# Patient Record
Sex: FEMALE | Race: WHITE | NOT HISPANIC OR LATINO | Employment: OTHER | URBAN - METROPOLITAN AREA
[De-identification: names, ages, dates, MRNs, and addresses within clinical notes are randomized per-mention and may not be internally consistent; named-entity substitution may affect disease eponyms.]

---

## 2024-03-21 ENCOUNTER — TRANSCRIBE ORDERS (OUTPATIENT)
Dept: ORTHOPEDIC SURGERY | Facility: HOSPITAL | Age: 39
End: 2024-03-21
Payer: COMMERCIAL

## 2024-03-21 DIAGNOSIS — M54.50 LOW BACK PAIN, UNSPECIFIED BACK PAIN LATERALITY, UNSPECIFIED CHRONICITY, UNSPECIFIED WHETHER SCIATICA PRESENT: ICD-10-CM

## 2024-03-22 ENCOUNTER — OFFICE VISIT (OUTPATIENT)
Dept: ORTHOPEDIC SURGERY | Facility: CLINIC | Age: 39
End: 2024-03-22
Payer: COMMERCIAL

## 2024-03-22 ENCOUNTER — HOSPITAL ENCOUNTER (OUTPATIENT)
Dept: RADIOLOGY | Facility: CLINIC | Age: 39
Discharge: HOME | End: 2024-03-22
Payer: COMMERCIAL

## 2024-03-22 VITALS — WEIGHT: 191 LBS | HEIGHT: 65 IN | BODY MASS INDEX: 31.82 KG/M2

## 2024-03-22 DIAGNOSIS — M51.36 DDD (DEGENERATIVE DISC DISEASE), LUMBAR: Primary | ICD-10-CM

## 2024-03-22 DIAGNOSIS — M54.16 LUMBAR RADICULOPATHY: ICD-10-CM

## 2024-03-22 DIAGNOSIS — M54.50 LOW BACK PAIN, UNSPECIFIED BACK PAIN LATERALITY, UNSPECIFIED CHRONICITY, UNSPECIFIED WHETHER SCIATICA PRESENT: ICD-10-CM

## 2024-03-22 PROCEDURE — 72110 X-RAY EXAM L-2 SPINE 4/>VWS: CPT

## 2024-03-22 PROCEDURE — 72110 X-RAY EXAM L-2 SPINE 4/>VWS: CPT | Performed by: RADIOLOGY

## 2024-03-22 PROCEDURE — 99203 OFFICE O/P NEW LOW 30 MIN: CPT | Performed by: ORTHOPAEDIC SURGERY

## 2024-03-22 PROCEDURE — 1036F TOBACCO NON-USER: CPT | Performed by: ORTHOPAEDIC SURGERY

## 2024-03-22 RX ORDER — BUPROPION HYDROCHLORIDE 300 MG/1
300 TABLET ORAL DAILY
COMMUNITY

## 2024-03-22 RX ORDER — ERGOCALCIFEROL 1.25 MG/1
1 CAPSULE ORAL
COMMUNITY
Start: 2024-03-06

## 2024-03-22 RX ORDER — DESVENLAFAXINE 100 MG/1
100 TABLET, EXTENDED RELEASE ORAL DAILY
COMMUNITY

## 2024-03-22 RX ORDER — DULAGLUTIDE 4.5 MG/.5ML
INJECTION, SOLUTION SUBCUTANEOUS
COMMUNITY
Start: 2024-02-05

## 2024-03-25 NOTE — PROGRESS NOTES
HPI:Mariela Cuevas is a pleasant 39-year-old woman, who comes in today with a history of low back pain, intermittent symptoms into the legs, and even problems with urinary retention.  Her symptoms began back in 2019.  She has not had urodynamics studies done.  She has not had any recent physical therapy done.  She has had imaging done at the [facility].  She has no saddle anesthesia.      ROS:  Reviewed on EMR and patient intake sheet.    PMH/SH:  Reviewed on EMR and patient intake sheet.    Exam:  Physical Exam    Constitutional: Well appearing; no acute distress  Eyes: pupils are equal and round  Psych: normal affect  Respiratory: non-labored breathing  Cardiovascular: regular rate and rhythm  GI: non-distended abdomen  Musculoskeletal: no pain with range of motion of the hips bilaterally  Neurologic: [4+]/5 strength in the lower extremities bilaterally]; [negative] straight leg raise    Radiology:     MRI from the [facility] was personally reviewed.  She does have a transitional lumbosacral vertebrae with mild disc degeneration.  There are some moderate canal narrowing.    Diagnosis:    Disc degeneration; lumbar stenosis; lumbar radiculopathy    Assessment and Plan:   39-year-old woman, with some chronic back pain and intermittent radicular symptoms over the course of the last 5 years.  Her MRI does not demonstrate any severe canal stenosis.  I have recommended some physical therapy for the lumbar spine and potential reimaging if her symptoms do not improve.  Additionally I recommended that she follow-up with urologist for further urodynamic studies as the degree of stenosis on her MRI does not explain her more chronic urinary retention and incontinence issues.  I can see her back as needed should her symptoms not improved.    The patient was in agreement with the plan. At the end of the visit today, the patient felt that all questions had been answered satisfactorily.  The patient was pleased  with the visit and very appreciative for the care rendered.     Thank you very much for the kind referral.  It is a privilege, and a pleasure, to partner with you in the care of your patients.  I would be delighted to assist you with any further consultations as needed.          Maikel Linder MD    Chief of Spine Surgery, [facility]  Director of Spine Service, [facility]  , Department of Orthopaedics  [employer]  [address] [address] [id number]  P: [phone]  [facility]    This note was dictated with voice recognition software.  It has not been proofread for grammatical errors, typographical mistakes or other semantic inconsistencies.

## 2024-04-10 ENCOUNTER — EVALUATION (OUTPATIENT)
Dept: PHYSICAL THERAPY | Facility: HOSPITAL | Age: 39
End: 2024-04-10
Payer: COMMERCIAL

## 2024-04-10 DIAGNOSIS — M54.16 LUMBAR RADICULOPATHY: Primary | ICD-10-CM

## 2024-04-10 PROCEDURE — 97161 PT EVAL LOW COMPLEX 20 MIN: CPT | Mod: GP | Performed by: PHYSICAL THERAPIST

## 2024-04-10 ASSESSMENT — ENCOUNTER SYMPTOMS
LOSS OF SENSATION IN FEET: 0
DEPRESSION: 0
OCCASIONAL FEELINGS OF UNSTEADINESS: 0

## 2024-04-10 ASSESSMENT — PAIN - FUNCTIONAL ASSESSMENT: PAIN_FUNCTIONAL_ASSESSMENT: 0-10

## 2024-04-10 ASSESSMENT — PAIN DESCRIPTION - DESCRIPTORS: DESCRIPTORS: SHARP;DULL;THROBBING

## 2024-04-10 ASSESSMENT — PAIN SCALES - GENERAL: PAINLEVEL_OUTOF10: 3

## 2024-04-10 NOTE — PROGRESS NOTES
"  Physical Therapy  Physical Therapy Evaluation     Patient Name: Mariela Cuevas  MRN: [id number]  Today's Date: 4/10/2024  Time Calculation  Start Time: 1057  Stop Time: 1130  Time Calculation (min): 33 min    Today's Charges  PT Evaluation Time Entry  PT Evaluation (Low) Time Entry: 33    Insurance:  Visit number: 1 of 1  Authorization info: requires auth after eval  Insurance Type: [employer]    Current Problem  1. Lumbar radiculopathy  Referral to Physical Therapy    Follow Up In Physical Therapy          General:  General  Reason for Referral: LBP  Referred By: Dr. Linder      Precautions:   Precautions  STEADI Fall Risk Score (The score of 4 or more indicates an increased risk of falling): 1    Medical History Form: Reviewed (scanned into chart)    Subjective:   Subjective   Chief Complaint: Patient is a 39 year old female who presents to clinic with complaints of low back pain. Patient states that she has had low back pain for a few years.  Onset Date: 3/22/2024  LESLEE: Chronic    Current Condition:   Better    Pain:  Pain Assessment: 0-10  Pain Score: 3 (up to 6/10)  Pain Location: Back  Pain Orientation: Right, Left, Lower  Pain Radiating Towards: bilateral anterior hip with occasional pain in left lateral lower leg  Pain Descriptors: Sharp, Dull, Throbbing  Pain Frequency: Intermittent    Aggravating Factors:  Sitting, Bending Forward, and Carrying  Relieving Factors:  walking    Relevant Information (PMH & Previous Tests/Imaging): recent x ray indicating, \"Mild lumbar degenerative changes slightly more prominent at the L4-5 level.\"  Previous Interventions/Treatments: Chiropractic    Prior Level of Function (PLOF)  Patient previously independent with all ADLs  Exercise/Physical Activity: kayak,  baseball  Work/School: full time    Hand Dominance: right     Patients Living Environment: Reviewed and no concern  Home Living  Home Living Comment: No stairs, no mobility concerns.  Prior Function Per " Pt/Caregiver Report  Level of [place]: Independent with ADLs and functional transfers, Independent with homemaking with ambulation  Primary Language: English    Patient's Goal(s) for Therapy: decrease low back pain     Red Flags: Do you have any of the following? No  Fever/chills, unexplained weight changes, dizziness/fainting, unexplained change in bowel or bladder functions, unexplained malaise or muscle weakness, night pain/sweats, numbness or tingling    Objective:  Objective     Lumbar AROM  Lumbar AROM WFL: no    Special Tests  Slump: (Negative): + BLE     Lumbar AROM  Lumbar AROM WFL: no  Lumbar flexion: (60°): mod restriction (pain)  Lumbar extension (25°): min restriction (pain)  Lumbar rotation right (30°): WFL (pain)  Lumbar rotation left (30°): WFL  Lumbar sidebend right (25°): WFL  Lumbar sidebend left (25°): WFL (pain)    Hip PROM  Hip PROM WFL: yes    Specific Lower Extremity MMT  R Iliopsoas: (5/5): 4/5  L Iliopsoas: (5/5): 4/5  R Gluteals (prone): (5/5): 4+/5  L Gluteals (prone): (5/5): 4+/5  R Gluteals (sidelying): (5/5): 5/5  L Gluteals (sidelying): (5/5): 5/5    Special Tests  Supine SLR: (Negative): -  HONORIO: (Negative): -    Flexibility  R hamstrings: WFL  L hamstrings: WFL    Knee MMT  R knee flexion: (5/5): 5/5  L knee flexion: (5/5): 5/5  R knee extension: (5/5): not tested due to pain  L knee extension: (5/5): not tested due to pain    Posture: shoulder rounded forward    Lower Extremity Functional Movements  Transfers: Independent  Gait: none  Assistive Device no device    Outcome Measures:  Other Measures  Oswestry Disablity Index (VIKOTR): 26% (13)     EDUCATION:   Individual(s) Educated: patient   Education Provided: Home exercise program, plan of care, activity modifications, pain management, and injury pathology  Handout(s) Provided: Scanned into chart  Home Program: Access Code: [email or web address]  Risk and Benefits Discussed with Patient/Caregiver/Other: Yes   Patient/Caregiver Demonstrated  Understanding: Yes   Plan of Care Discussed and Agreed Upon: Yes   Patient Response to Education: Patient/Caregiver verbalized understanding of information and Patient/Caregiver performed return demonstration of exercises/activities    Assessment: Patient demonstrated impaired strength and limited range of motion due to low back pain. Patient presents with signs and symptoms consistent with low back pain, resulting in limited participation in pain-free ADLs and inability to perform at their prior level of function. Pt would benefit from physical therapy to address the impairments found & listed previously in the objective section in order to return to safe and pain-free ADLs and prior level of function.  PT Assessment Results: Decreased strength, Pain, Decreased range of motion  Rehab Prognosis: Good  Evaluation/Treatment Tolerance: Patient limited by pain    Complexity: low    Plan:  Treatment/Interventions: Education/ Instruction, Gait training, Manual therapy, Neuromuscular re-education, Therapeutic activities, Therapeutic exercises  PT Plan: Skilled PT  PT Frequency: 1 time per week  Duration: 4 weeks  Onset Date: 03/22/24  Certification Period Start Date: 04/10/24  Certification Period End Date: 05/08/24  Number of Treatments Authorized: 1 of 1  Rehab Potential: Good  Plan of Care Agreement: Patient  Planned Interventions include: therapeutic exercise, self-care home management, manual therapy, therapeutic activities, gait training, neuromuscular coordination, vasopneumatic, dry needling, aquatic therapy    Goals: Set and discussed today  Active       PT Problem       Patient will maintain single leg stand on each leg for at least 30 sec with no upper extremity support.       Start:  04/10/24    Expected End:  05/08/24            Patient will achieve spinal flexion to WFL without pain.       Start:  04/10/24    Expected End:  05/08/24            Patient will achieve spinal extension to WFL without pain.        Start:  04/10/24    Expected End:  05/08/24            Patient will achieve bilateral spinal side bending ROM WFL without pain.       Start:  04/10/24    Expected End:  05/08/24            Patient will achieve bilateral spinal rotation ROM WFL without pain.       Start:  04/10/24    Expected End:  05/08/24            Patient will achieve bilateral hip flexion strength of at least 4+/5       Start:  04/10/24    Expected End:  05/08/24            Patient will demonstrate independence in home program for support of progression       Start:  04/10/24    Expected End:  04/24/24            Patient will report pain of no more than 2/10 demonstrating a reduction of overall pain       Start:  04/10/24    Expected End:  05/08/24            Patient will show a significant change in VIKTOR (26% to 14%) patient reported outcome tool to demonstrate subjective imporovement       Start:  04/10/24    Expected End:  05/08/24                Plan of care was developed with input and agreement by the patient          Patrick Zuñiga, PT

## 2024-04-19 ENCOUNTER — TREATMENT (OUTPATIENT)
Dept: PHYSICAL THERAPY | Facility: HOSPITAL | Age: 39
End: 2024-04-19
Payer: COMMERCIAL

## 2024-04-19 DIAGNOSIS — M54.16 LUMBAR RADICULOPATHY: ICD-10-CM

## 2024-04-19 PROCEDURE — 97110 THERAPEUTIC EXERCISES: CPT | Mod: GP,CQ

## 2024-04-19 PROCEDURE — 97140 MANUAL THERAPY 1/> REGIONS: CPT | Mod: GP,CQ

## 2024-04-19 ASSESSMENT — PAIN - FUNCTIONAL ASSESSMENT: PAIN_FUNCTIONAL_ASSESSMENT: 0-10

## 2024-04-19 ASSESSMENT — PAIN DESCRIPTION - DESCRIPTORS: DESCRIPTORS: PRESSURE;RADIATING;SHARP;SHOOTING

## 2024-04-19 ASSESSMENT — PAIN SCALES - GENERAL: PAINLEVEL_OUTOF10: 7

## 2024-04-19 NOTE — PROGRESS NOTES
Physical Therapy Treatment    Patient Name: Mariela Cuevas  MRN: [id number]  Today's Date: 4/19/2024  Time Calculation  Start Time: 1206  Stop Time: 1300  Time Calculation (min): 54 min         Current Problem  1. Lumbar radiculopathy  Follow Up In Physical Therapy          General  Reason for Referral: LBP  Referred By: Dr. Linder    Subjective   Current Condition:   Better  Patient reports pain at 10/10 last night and slightly better today with ibuprofen at 7/10. Pt. Has difficulty sitting on R hip, and reports she had been crying earlier.    Performing HEP?: Yes    Precautions  Precautions  STEADI Fall Risk Score (The score of 4 or more indicates an increased risk of falling): 1    Pain  Pain Assessment: 0-10  Pain Score: 7 (on ibuprophen)  Pain Location: Back  Pain Orientation: Right  Pain Radiating Towards: post hip  Pain Descriptors: Pressure, Radiating, Sharp, Shooting  Pain Frequency: Constant/continuous    Objective   Lumbar Spine  Functional Rating Scale   Pt. Unable to flex at waist to put her shoes on  Observation   Pt. Not shifted at conclusion of therapy. She was walking upright and felt better in LB and post L hip  Lumbar AROM   Pt. Shifted to the R, with difficulty sitting  Treatments:    Therapeutic Exercise  Therapeutic Exercise Performed: Yes  Therapeutic Exercise Activity 1: PPT 5 sec. x 10  Therapeutic Exercise Activity 2: LTR x 10  Therapeutic Exercise Activity 3: hip ADD with ball 5 sec. x 1  Therapeutic Exercise Activity 4: prone on elbows x 2 min.  Therapeutic Exercise Activity 5: up on hands 3 sec. x 10  Therapeutic Exercise Activity 6: lat shift at wall to L  Therapeutic Exercise Activity 7: hip ABD (attempt caused pain)  Therapeutic Exercise Activity 8: bridge (attempt caused pain)    Manual Therapy  Manual Therapy Performed: Yes  Manual Therapy Activity 1: light distraction lumbar spine  Manual Therapy Activity 2: Lat shift to L to fix shift to R  Manual Therapy Activity 3: STM/DTM  to LB to decrease muscle guarding    EDUCATION:   Outpatient Education  Individual(s) Educated: Patient  Education Provided: Home Exercise Program  Patient/Caregiver Demonstrated Understanding: yes    Assessment: Pt able to tolerate light exercises, mainly positioning, and noted extension preference, Pt. Reported excruciating pain at 10/10 last night and slightly better today with ibuprofen.and pain centralized after treatment. Will continue PT POC and advance as tolerated.  PT Assessment  PT Assessment Results: Decreased strength, Pain, Decreased range of motion  Rehab Prognosis: Good  Evaluation/Treatment Tolerance: Patient limited by pain    Plan:  OP PT Plan  Treatment/Interventions: Education/ Instruction  PT Plan: Skilled PT  PT Frequency: 1 time per week  Duration: 4 weeks  Onset Date: 03/22/24  Certification Period Start Date: 04/10/24  Certification Period End Date: 05/08/24  Number of Treatments Authorized: 2 of 4  Rehab Potential: Good  Plan of Care Agreement: Patient    Goals:  Active       PT Problem       Patient will maintain single leg stand on each leg for at least 30 sec with no upper extremity support.       Start:  04/10/24    Expected End:  05/08/24            Patient will achieve spinal flexion to WFL without pain.       Start:  04/10/24    Expected End:  05/08/24            Patient will achieve spinal extension to WFL without pain.       Start:  04/10/24    Expected End:  05/08/24            Patient will achieve bilateral spinal side bending ROM WFL without pain.       Start:  04/10/24    Expected End:  05/08/24            Patient will achieve bilateral spinal rotation ROM WFL without pain.       Start:  04/10/24    Expected End:  05/08/24            Patient will achieve bilateral hip flexion strength of at least 4+/5       Start:  04/10/24    Expected End:  05/08/24            Patient will demonstrate independence in home program for support of progression       Start:  04/10/24    Expected  End:  04/24/24            Patient will report pain of no more than 2/10 demonstrating a reduction of overall pain       Start:  04/10/24    Expected End:  05/08/24            Patient will show a significant change in VIKTOR (26% to 14%) patient reported outcome tool to demonstrate subjective imporovement       Start:  04/10/24    Expected End:  05/08/24                 Roxanna Gentile, PTA

## 2024-04-26 ENCOUNTER — TREATMENT (OUTPATIENT)
Dept: PHYSICAL THERAPY | Facility: HOSPITAL | Age: 39
End: 2024-04-26
Payer: COMMERCIAL

## 2024-04-26 DIAGNOSIS — M54.16 LUMBAR RADICULOPATHY: ICD-10-CM

## 2024-04-26 PROCEDURE — 97110 THERAPEUTIC EXERCISES: CPT | Mod: GP | Performed by: PHYSICAL THERAPIST

## 2024-04-26 ASSESSMENT — PAIN SCALES - GENERAL: PAINLEVEL_OUTOF10: 0 - NO PAIN

## 2024-04-26 ASSESSMENT — PAIN - FUNCTIONAL ASSESSMENT: PAIN_FUNCTIONAL_ASSESSMENT: 0-10

## 2024-04-26 NOTE — PROGRESS NOTES
"  Physical Therapy Treatment    Patient Name: Mariela Cuevas  MRN: [id number]  Today's Date: 4/26/2024  Time Calculation  Start Time: 1033  Stop Time: 1112  Time Calculation (min): 39 min                         Current Problem  1. Lumbar radiculopathy  Follow Up In Physical Therapy        Problem List Items Addressed This Visit             ICD-10-CM    Lumbar radiculopathy M54.16       General  Reason for Referral: LBP  Referred By: Dr. Linder    Subjective   Current Condition:   Better  Patient reports improved pain since her last visit.    Performing HEP?: Yes    Precautions     Pain  Pain Assessment: 0-10  Pain Score: 0 - No pain    Objective     Treatments:  Therapeutic Exercise  Therapeutic Exercise Performed: Yes  Therapeutic Exercise Activity 1: PPT 5 sec. x 10  Therapeutic Exercise Activity 2: LTR x 10  Therapeutic Exercise Activity 4: prone on elbows x 2 min.  Therapeutic Exercise Activity 5: up on hands 10x3 sec  Therapeutic Exercise Activity 6: lat shift at wall to R/L x20  Therapeutic Exercise Activity 7: standing hip abd x10  Therapeutic Exercise Activity 8: bridge with hip add x10  Therapeutic Exercise Activity 9: Sci Fit bike lvl1 x5' seat #8  Therapeutic Exercise Activity 10: standing hip ext x10  Therapeutic Exercise Activity 11: heel raises x20  Therapeutic Exercise Activity 12: gastroc stretch on wedge x1'  Therapeutic Exercise Activity 13: hamstring stretch 3x15\"  Therapeutic Exercise Activity 14: pallof press iso with dbl grn x10 R/L  Therapeutic Exercise Activity 15: STS 2# MB overhead press    EDUCATION:   Individual(s) Educated: Patient  Education Provided: yes  Handout(s) Provided: Scanned into chart  Home Program: reviewed home exercise program   Risk and Benefits Discussed with Patient/Caregiver/Other: Yes   Patient/Caregiver Demonstrated Understanding: Yes   Patient Response to Education: Patient/Caregiver verbalized understanding of information and Patient/Caregiver performed return " demonstration of exercises/activities    Assessment: Patient demonstrated improved tolerance to exercises without complaints of increased pain. Patient demonstrated the ability to sit without a side lean. Patient demonstrated hip adduction with knee touching during sit<->stand transfers.  PT Assessment  PT Assessment Results: Decreased strength, Pain, Decreased range of motion  Rehab Prognosis: Good  Evaluation/Treatment Tolerance: Patient tolerated treatment well    Plan: Continue with POC.  Progress strength and flexibility as tolerated.  OP PT Plan  Treatment/Interventions: Education/ Instruction  PT Plan: Skilled PT  PT Frequency: 1 time per week  Duration: 4 weeks  Onset Date: 03/22/24  Certification Period Start Date: 04/10/24  Certification Period End Date: 05/08/24  Number of Treatments Authorized: 3 of 4  Rehab Potential: Good  Plan of Care Agreement: Patient    Goals:  Active       PT Problem       Patient will maintain single leg stand on each leg for at least 30 sec with no upper extremity support.       Start:  04/10/24    Expected End:  05/08/24            Patient will achieve spinal flexion to WFL without pain.       Start:  04/10/24    Expected End:  05/08/24            Patient will achieve spinal extension to WFL without pain.       Start:  04/10/24    Expected End:  05/08/24            Patient will achieve bilateral spinal side bending ROM WFL without pain.       Start:  04/10/24    Expected End:  05/08/24            Patient will achieve bilateral spinal rotation ROM WFL without pain.       Start:  04/10/24    Expected End:  05/08/24            Patient will achieve bilateral hip flexion strength of at least 4+/5       Start:  04/10/24    Expected End:  05/08/24            Patient will demonstrate independence in home program for support of progression       Start:  04/10/24    Expected End:  04/24/24            Patient will report pain of no more than 2/10 demonstrating a reduction of overall pain        Start:  04/10/24    Expected End:  05/08/24            Patient will show a significant change in VIKTOR (26% to 14%) patient reported outcome tool to demonstrate subjective imporovement       Start:  04/10/24    Expected End:  05/08/24                 Patrick Zuñiga, PT

## 2024-05-03 ENCOUNTER — APPOINTMENT (OUTPATIENT)
Dept: PHYSICAL THERAPY | Facility: HOSPITAL | Age: 39
End: 2024-05-03
Payer: COMMERCIAL

## 2024-05-10 ENCOUNTER — APPOINTMENT (OUTPATIENT)
Dept: PHYSICAL THERAPY | Facility: HOSPITAL | Age: 39
End: 2024-05-10
Payer: COMMERCIAL

## 2024-06-12 ENCOUNTER — DOCUMENTATION (OUTPATIENT)
Dept: PHYSICAL THERAPY | Facility: HOSPITAL | Age: 39
End: 2024-06-12
Payer: COMMERCIAL

## 2024-06-12 NOTE — PROGRESS NOTES
Physical Therapy    Discharge Summary    Name: Mariela Cuevas  MRN: [id number]  : 1985  Date: 24    Discharge Summary: PT    Discharge Information: Date of discharge 2024, Date of last visit 2024, Date of evaluation 4/10/2024, Number of attended visits 3, Referred by Dr. Linder, and Referred for LOW BACK PAIN     Therapy Summary: Patient is a 39 year old female who presents to clinic with complaints of low back pain. Patient states that she has had low back pain for a few years.     Discharge Status: Patient demonstrated improved tolerance to exercises without complaints of increased pain. Patient demonstrated the ability to sit without a side lean. Patient demonstrated hip adduction with knee touching during sit<->stand transfers.      Rehab Discharge Reason: Failed to schedule and/or keep follow-up appointment(s)

## 2024-10-16 ENCOUNTER — APPOINTMENT (OUTPATIENT)
Facility: CLINIC | Age: 39
End: 2024-10-16
Payer: COMMERCIAL

## 2024-10-16 ENCOUNTER — LAB (OUTPATIENT)
Dept: LAB | Facility: LAB | Age: 39
End: 2024-10-16
Payer: MEDICAID

## 2024-10-16 VITALS
WEIGHT: 194 LBS | OXYGEN SATURATION: 99 % | SYSTOLIC BLOOD PRESSURE: 117 MMHG | BODY MASS INDEX: 32.28 KG/M2 | HEART RATE: 75 BPM | DIASTOLIC BLOOD PRESSURE: 77 MMHG

## 2024-10-16 DIAGNOSIS — R76.8 POSITIVE ANA (ANTINUCLEAR ANTIBODY): ICD-10-CM

## 2024-10-16 DIAGNOSIS — Z92.89 HISTORY OF POSITIVE DOUBLE STRANDED DNA ANTIBODY TEST: ICD-10-CM

## 2024-10-16 DIAGNOSIS — M54.50 CHRONIC LOW BACK PAIN, UNSPECIFIED BACK PAIN LATERALITY, UNSPECIFIED WHETHER SCIATICA PRESENT: ICD-10-CM

## 2024-10-16 DIAGNOSIS — R76.8 POSITIVE ANA (ANTINUCLEAR ANTIBODY): Primary | ICD-10-CM

## 2024-10-16 DIAGNOSIS — G89.29 CHRONIC LOW BACK PAIN, UNSPECIFIED BACK PAIN LATERALITY, UNSPECIFIED WHETHER SCIATICA PRESENT: ICD-10-CM

## 2024-10-16 PROBLEM — G95.9 MYELOPATHY (MULTI): Status: ACTIVE | Noted: 2020-01-22

## 2024-10-16 PROBLEM — R20.0 PERINEAL NUMBNESS: Status: ACTIVE | Noted: 2020-01-22

## 2024-10-16 PROBLEM — E61.0 COPPER DEFICIENCY: Status: ACTIVE | Noted: 2020-02-11

## 2024-10-16 PROBLEM — R93.7 ABNORMAL MRI, LUMBAR SPINE: Status: ACTIVE | Noted: 2020-01-22

## 2024-10-16 PROBLEM — B97.7 HUMAN PAPILLOMA VIRUS (HPV) INFECTION: Status: ACTIVE | Noted: 2023-03-10

## 2024-10-16 PROBLEM — G43.009 MIGRAINE WITHOUT AURA AND WITHOUT STATUS MIGRAINOSUS, NOT INTRACTABLE: Status: ACTIVE | Noted: 2024-02-21

## 2024-10-16 PROBLEM — F41.9 ANXIETY DISORDER: Status: ACTIVE | Noted: 2022-06-08

## 2024-10-16 PROBLEM — N31.9 NEUROGENIC BLADDER: Status: ACTIVE | Noted: 2020-01-22

## 2024-10-16 PROBLEM — E06.3 CHRONIC LYMPHOCYTIC THYROIDITIS: Status: ACTIVE | Noted: 2024-10-16

## 2024-10-16 PROBLEM — A69.20 LYME DISEASE: Status: ACTIVE | Noted: 2020-02-11

## 2024-10-16 PROBLEM — S82.851D: Status: ACTIVE | Noted: 2018-01-08

## 2024-10-16 PROBLEM — E53.8 B12 DEFICIENCY: Status: ACTIVE | Noted: 2020-02-11

## 2024-10-16 PROBLEM — H02.402 PTOSIS OF LEFT EYELID: Status: ACTIVE | Noted: 2020-02-11

## 2024-10-16 PROBLEM — E66.812 OBESITY, CLASS II, BMI 35-39.9: Status: ACTIVE | Noted: 2023-09-15

## 2024-10-16 PROBLEM — E55.9 VITAMIN D DEFICIENCY: Status: ACTIVE | Noted: 2018-01-30

## 2024-10-16 PROBLEM — F89 NEURODEVELOPMENTAL DISORDER: Status: ACTIVE | Noted: 2022-06-08

## 2024-10-16 PROBLEM — F33.0 MILD EPISODE OF RECURRENT MAJOR DEPRESSIVE DISORDER (CMS-HCC): Chronic | Status: ACTIVE | Noted: 2022-06-08

## 2024-10-16 LAB
ALBUMIN SERPL BCP-MCNC: 4.5 G/DL (ref 3.4–5)
ALP SERPL-CCNC: 75 U/L (ref 33–110)
ALT SERPL W P-5'-P-CCNC: 9 U/L (ref 7–45)
AMORPH CRY #/AREA UR COMP ASSIST: NORMAL /HPF
ANION GAP SERPL CALC-SCNC: 12 MMOL/L (ref 10–20)
APPEARANCE UR: ABNORMAL
AST SERPL W P-5'-P-CCNC: 12 U/L (ref 9–39)
BASOPHILS # BLD AUTO: 0.03 X10*3/UL (ref 0–0.1)
BASOPHILS NFR BLD AUTO: 0.7 %
BILIRUB SERPL-MCNC: 1 MG/DL (ref 0–1.2)
BILIRUB UR STRIP.AUTO-MCNC: NEGATIVE MG/DL
BUN SERPL-MCNC: 9 MG/DL (ref 6–23)
C3 SERPL-MCNC: 128 MG/DL (ref 87–200)
C4 SERPL-MCNC: 33 MG/DL (ref 10–50)
CALCIUM SERPL-MCNC: 9.5 MG/DL (ref 8.6–10.3)
CENTROMERE B AB SER-ACNC: <0.2 AI
CHLORIDE SERPL-SCNC: 109 MMOL/L (ref 98–107)
CHROMATIN AB SERPL-ACNC: <0.2 AI
CO2 SERPL-SCNC: 23 MMOL/L (ref 21–32)
COLOR UR: YELLOW
CREAT SERPL-MCNC: 0.96 MG/DL (ref 0.5–1.05)
DSDNA AB SER-ACNC: 13 IU/ML
EGFRCR SERPLBLD CKD-EPI 2021: 77 ML/MIN/1.73M*2
ENA JO1 AB SER QL IA: <0.2 AI
ENA RNP AB SER IA-ACNC: <0.2 AI
ENA SCL70 AB SER QL IA: <0.2 AI
ENA SM AB SER IA-ACNC: <0.2 AI
ENA SM+RNP AB SER QL IA: <0.2 AI
ENA SS-A AB SER IA-ACNC: <0.2 AI
ENA SS-B AB SER IA-ACNC: <0.2 AI
EOSINOPHIL # BLD AUTO: 0.06 X10*3/UL (ref 0–0.7)
EOSINOPHIL NFR BLD AUTO: 1.3 %
ERYTHROCYTE [DISTWIDTH] IN BLOOD BY AUTOMATED COUNT: 12.6 % (ref 11.5–14.5)
GLUCOSE SERPL-MCNC: 81 MG/DL (ref 74–99)
GLUCOSE UR STRIP.AUTO-MCNC: NORMAL MG/DL
HCT VFR BLD AUTO: 43.3 % (ref 36–46)
HGB BLD-MCNC: 13.9 G/DL (ref 12–16)
IMM GRANULOCYTES # BLD AUTO: 0.01 X10*3/UL (ref 0–0.7)
IMM GRANULOCYTES NFR BLD AUTO: 0.2 % (ref 0–0.9)
KETONES UR STRIP.AUTO-MCNC: NEGATIVE MG/DL
LEUKOCYTE ESTERASE UR QL STRIP.AUTO: NEGATIVE
LYMPHOCYTES # BLD AUTO: 1.56 X10*3/UL (ref 1.2–4.8)
LYMPHOCYTES NFR BLD AUTO: 35 %
MCH RBC QN AUTO: 30.2 PG (ref 26–34)
MCHC RBC AUTO-ENTMCNC: 32.1 G/DL (ref 32–36)
MCV RBC AUTO: 94 FL (ref 80–100)
MONOCYTES # BLD AUTO: 0.33 X10*3/UL (ref 0.1–1)
MONOCYTES NFR BLD AUTO: 7.4 %
MUCOUS THREADS #/AREA URNS AUTO: NORMAL /LPF
NEUTROPHILS # BLD AUTO: 2.47 X10*3/UL (ref 1.2–7.7)
NEUTROPHILS NFR BLD AUTO: 55.4 %
NITRITE UR QL STRIP.AUTO: NEGATIVE
NRBC BLD-RTO: 0 /100 WBCS (ref 0–0)
PH UR STRIP.AUTO: 7.5 [PH]
PLATELET # BLD AUTO: 321 X10*3/UL (ref 150–450)
POTASSIUM SERPL-SCNC: 3.9 MMOL/L (ref 3.5–5.3)
PROT SERPL-MCNC: 7.5 G/DL (ref 6.4–8.2)
PROT UR STRIP.AUTO-MCNC: ABNORMAL MG/DL
RBC # BLD AUTO: 4.61 X10*6/UL (ref 4–5.2)
RBC # UR STRIP.AUTO: NEGATIVE /UL
RBC #/AREA URNS AUTO: NORMAL /HPF
RIBOSOMAL P AB SER-ACNC: <0.2 AI
SODIUM SERPL-SCNC: 140 MMOL/L (ref 136–145)
SP GR UR STRIP.AUTO: 1.02
SQUAMOUS #/AREA URNS AUTO: NORMAL /HPF
TREPONEMA PALLIDUM IGG+IGM AB [PRESENCE] IN SERUM OR PLASMA BY IMMUNOASSAY: NONREACTIVE
UROBILINOGEN UR STRIP.AUTO-MCNC: NORMAL MG/DL
WBC # BLD AUTO: 4.5 X10*3/UL (ref 4.4–11.3)
WBC #/AREA URNS AUTO: NORMAL /HPF

## 2024-10-16 PROCEDURE — 86780 TREPONEMA PALLIDUM: CPT

## 2024-10-16 PROCEDURE — 99205 OFFICE O/P NEW HI 60 MIN: CPT | Performed by: INTERNAL MEDICINE

## 2024-10-16 PROCEDURE — 36415 COLL VENOUS BLD VENIPUNCTURE: CPT

## 2024-10-16 PROCEDURE — 86038 ANTINUCLEAR ANTIBODIES: CPT

## 2024-10-16 PROCEDURE — 82570 ASSAY OF URINE CREATININE: CPT

## 2024-10-16 PROCEDURE — 86225 DNA ANTIBODY NATIVE: CPT

## 2024-10-16 PROCEDURE — 1036F TOBACCO NON-USER: CPT | Performed by: INTERNAL MEDICINE

## 2024-10-16 PROCEDURE — 84156 ASSAY OF PROTEIN URINE: CPT

## 2024-10-16 PROCEDURE — 86160 COMPLEMENT ANTIGEN: CPT

## 2024-10-16 PROCEDURE — 86235 NUCLEAR ANTIGEN ANTIBODY: CPT

## 2024-10-16 RX ORDER — ELECTROLYTES/DEXTROSE
5 SOLUTION, ORAL ORAL
COMMUNITY
End: 2024-10-16 | Stop reason: WASHOUT

## 2024-10-16 RX ORDER — TOPIRAMATE 25 MG/1
25 TABLET ORAL NIGHTLY
COMMUNITY
Start: 2024-05-04 | End: 2024-10-16 | Stop reason: WASHOUT

## 2024-10-16 RX ORDER — ETONOGESTREL AND ETHINYL ESTRADIOL VAGINAL .015; .12 MG/D; MG/D
RING VAGINAL
COMMUNITY
End: 2024-10-16 | Stop reason: WASHOUT

## 2024-10-16 RX ORDER — PHENTERMINE HYDROCHLORIDE 15 MG/1
15 CAPSULE ORAL
COMMUNITY
Start: 2024-09-24

## 2024-10-16 RX ORDER — NORETHINDRONE 0.35 MG/1
1 TABLET ORAL DAILY
COMMUNITY
End: 2024-10-16 | Stop reason: WASHOUT

## 2024-10-16 RX ORDER — SUMATRIPTAN 50 MG/1
TABLET, FILM COATED ORAL
COMMUNITY

## 2024-10-16 RX ORDER — COPPER 313.4 MG/1
1 INTRAUTERINE DEVICE INTRAUTERINE
COMMUNITY

## 2024-10-16 RX ORDER — SERTRALINE HYDROCHLORIDE 100 MG/1
100 TABLET, FILM COATED ORAL
COMMUNITY
Start: 2020-01-06 | End: 2024-10-16 | Stop reason: WASHOUT

## 2024-10-16 ASSESSMENT — ENCOUNTER SYMPTOMS
OCCASIONAL FEELINGS OF UNSTEADINESS: 0
DEPRESSION: 0
LOSS OF SENSATION IN FEET: 0

## 2024-10-16 ASSESSMENT — PAIN SCALES - GENERAL: PAINLEVEL_OUTOF10: 0-NO PAIN

## 2024-10-16 ASSESSMENT — PATIENT HEALTH QUESTIONNAIRE - PHQ9
1. LITTLE INTEREST OR PLEASURE IN DOING THINGS: NOT AT ALL
2. FEELING DOWN, DEPRESSED OR HOPELESS: NOT AT ALL
SUM OF ALL RESPONSES TO PHQ9 QUESTIONS 1 AND 2: 0

## 2024-10-16 NOTE — PROGRESS NOTES
Subjective   Patient ID: [id number]   Mariela Cuevas is a 39 y.o. female who presents for SLE?    HPI  Prior labs with a FLORESITA 1:80, dsDNA 17 in 2019. Repeat was 11  TPO +     In 2019 she had saddle anasthesia all of a sudden and self-resolved and for that reason she had the testing done.     She has had chronic low back for 10 years, she has Lumbar DDD, lumbar stenosis, lumbar radiculopathy. Low back pain is worse after prolonged sitting and first thing in AM, gets better with activity.     She also mentioned having done a home kit syphilis test from [facility] and it came back positive.     ROS  Constitutional: Denies fever, weight loss, night sweats. + Fatigue and unrefreshed in AM.   Eyes: Denies dry eyes, blurry vision, redness or pain or photophobia  ENT: + dry mouth, dental loss, loss of taste, nasal or oral ulcers, difficulty swallowing, nasal crusting or recurrent sinus infections   Cardiovascular: Denies chest pain  Respiratory: Denies shortness of breath, cough, asthma, or recurrent respiratory infections  Gastrointestinal: Denies nausea, vomiting, heartburn, abdominal pain, + chronic constipation. No diarrhea, melena or hematochezia  Genitourinary: No recurrent urinary infections or STDs, no genital or anal ulcers.  Integumentary: Denies photosensitivity, rash or lesions, Raynaud's phenomenon, psoriatic lesions, or alopecia. + hair thinning after COVID  Neurological: Denies any numbness or tingling in hands and feet, sometimes on/off in her face.  Hematologic/Lymphatic: Denies history of clots (arterial or venous), or abortions/miscarriages   MSK: No joint pains, redness, hotness or swelling. No enthesitis, dactylitis. No morning stiffness     PMH/PSH: Lumbar DDD, lumbar stenosis, lumbar radiculopathy, depression, anxiety, migraines  Social: Nonsmoker, alcohol intake a few times a year, no drug use. 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 Has 2 children.   FHx: Grandmother with MS       Patient Active Problem List   Diagnosis    Lumbar radiculopathy        Past Medical History:   Diagnosis Date    Encounter for screening for malignant neoplasm of cervix 2015    Cervical cancer screening    Papillomavirus as the cause of diseases classified elsewhere     HPV in female    Personal history of other diseases of the digestive system 2014    History of hemorrhoids    Personal history of other specified conditions     History of abnormal Pap smear        Past Surgical History:   Procedure Laterality Date     SECTION, CLASSIC  2017     Section    COLONOSCOPY  08/10/2016    Colonoscopy (Fiberoptic)    OTHER SURGICAL HISTORY  2015    Colposcopy Cervix With Biopsy(S)    OTHER SURGICAL HISTORY  2015    Cervical Conization Loop Electrode Excision    OTHER SURGICAL HISTORY  2015    Cervical Conization By Cold Knife        Social History     Socioeconomic History    Marital status: Single     Spouse name: Not on file    Number of children: Not on file    Years of education: Not on file    Highest education level: Not on file   Occupational History    Not on file   Tobacco Use    Smoking status: Never    Smokeless tobacco: Never   Substance and Sexual Activity    Alcohol use: Not on file    Drug use: Not on file    Sexual activity: Not on file   Other Topics Concern    Not on file   Social History Narrative    Not on file     Social Determinants of Health     Financial Resource Strain: At Risk (2022)    Received from [facility]    Financial Resource Strain     Financial Resource Strain: 2   Food Insecurity: Not at Risk (2022)    Received from [facility]    Food Insecurity     Food: 1   Transportation Needs: Not at Risk (2022)    Received from [facility]    Transportation Needs     Transportation: 1   Physical Activity: Not on File (2022)    Received from [facility]    Physical Activity     Physical  Activity: 0   Stress: At Risk (2022)    Received from [facility]    Stress     Stress: 2   Social Connections: Not at Risk (2022)    Received from [facility]    Social Connections     Social Connections and Isolation: 1   Intimate Partner Violence: Not on file   Housing Stability: Not at Risk (2022)    Received from [facility]    Housing Stability     Housin        Not on File       Current Outpatient Medications:     buPROPion XL (Wellbutrin XL) 300 mg 24 hr tablet, Take 1 tablet (300 mg) by mouth once daily., Disp: , Rfl:     desvenlafaxine 100 mg 24 hr tablet, Take 1 tablet (100 mg) by mouth once daily., Disp: , Rfl:     ergocalciferol (Vitamin D-2) 1.25 MG (48789 UT) capsule, Take 1 capsule (1,250 mcg) by mouth 1 (one) time per week., Disp: , Rfl:     Trulicity 4.5 mg/0.5 mL pen injector, INJECT 4.5MG INTO THE SKIN ONCE A WEEK, Disp: , Rfl:        Objective     Visit Vitals  /77   Pulse 75     Physical Exam  General: AAOx3, Cooperative  Head: normocephalic, atraumatic  Eyes: EOMI, conjunctiva clear, sclera white, anicteric  Ears: no redness, swelling, tenderness  Throat/Mouth: No oral deformities, no cheek swelling, mucosa appear moist, no oral ulcers noted or loss of dentition   Neck/Lymph: FROM, trachea midline  Neuro: CN II-XII grossly intact, no focal deficit  Skin: No rashes, ulcers or photosensitive areas  MSK: Upper Extremities:  Hand/Fingers: No erythema, swelling, tenderness or warmth at DIP, PIP, or MCP joints, FROM grossly. Good hand . No nodules. No deformities   Wrists: No erythema, swelling, warmth or tenderness at wrist, FROM grossly  Elbows: No tenderness, swelling, erythema or warmth at elbows, FROM grossly. No nodules   Shoulders: FROM  Lower Extremities:   Hips: No obvious deformities. No joint tenderness, normal ROM grossly. Log roll test negative bilaterally. Lucille test is negative bilaterally.   Knees: No tenderness, deformities, swelling, rashes, or warmth, normal ROM grossly.  "No crepitus  Ankles: No deformities, tenderness, edema, erythema, ulceration, or warmth at the ankle  Feet: Negative MTP squeeze. Normal ROM grossly.   Spine: + Paraspinal tenderness to palpation. No SI joint tenderness.     Lab Results   Component Value Date    WBC 4.7 12/12/2019    HGB 13.3 12/12/2019    HCT 41.4 12/12/2019    MCV 98 12/12/2019     12/12/2019        Chemistry    Lab Results   Component Value Date/Time     12/12/2019 0555    K 4.2 12/12/2019 0555     12/12/2019 0555    CO2 26 12/12/2019 0555    BUN 12 12/12/2019 0555    CREATININE 0.79 12/12/2019 0555    Lab Results   Component Value Date/Time    CALCIUM 8.7 12/12/2019 0555    ALKPHOS 60 12/10/2019 1748    AST 11 12/10/2019 1748    ALT 6 (L) 12/10/2019 1748    BILITOT 0.7 12/10/2019 1748           Lab Results   Component Value Date    CRP <0.10 12/12/2019      Lab Results   Component Value Date    FLORESITA NEGATIVE 12/12/2019    SEDRATE 6 12/12/2019      No results found for: \"CKTOTAL\"  Lab Results   Component Value Date    NEUTROABS 3.75 12/10/2019      No results found for: \"FERRITIN\"   No results found for: \"HEPATOT\", \"HEPAIGM\", \"HEPBCIGM\", \"HEPBCAB\", \"HBEAG\", \"HEPCAB\"   Lab Results   Component Value Date    ALT 6 (L) 12/10/2019    AST 11 12/10/2019    ALKPHOS 60 12/10/2019    BILITOT 0.7 12/10/2019      No results found for: \"PPD\"   No results found for: \"URICACID\"   Lab Results   Component Value Date    CALCIUM 8.7 12/12/2019      No results found for: \"SPEP\", \"UPEP\"   No results found for: \"ALBUR\", \"LAY78OEH\"     XR lumbar spine complete 4+ views  Narrative: Interpreted By:  Pankaj Clifford,   STUDY:  XR LUMBAR SPINE COMPLETE 4+ VIEWS      INDICATION:  Signs/Symptoms:lower back pain.      COMPARISON:  None      ACCESSION NUMBER(S):  [id number]      ORDERING CLINICIAN:  ZAHIDA NAVA      FINDINGS:  Mild lumbar degenerative changes slightly more prominent at the L4-5  level.      Mild scoliosis.      No subluxation or pathologic " motion. No evidence of fracture.      Impression: Mild lumbar degenerative changes slightly more prominent at the L4-5  level.      Mild scoliosis.      Signed by: Pankaj Clifford 3/23/2024 5:04 PM  Dictation workstation:   [id number]     === 03/22/24 ===    XR LUMBAR SPINE COMPLETE 4+ VIEWS    - Impression -  Mild lumbar degenerative changes slightly more prominent at the L4-5  level.    Mild scoliosis.    Signed by: Pankaj Clifford 3/23/2024 5:04 PM  Dictation workstation:   [id number]     MR cervical/thoracic/lumbar spine 2019:  FINDINGS:  Cervical spine:     Alignment: There is mild reversal of the normal cervical lordosis.     Vertebrae/Intervertebral Discs: The vertebral bodies demonstrate  expected height. The marrow signal is within normal limits. There is  minimal degenerative endplate spurring. There is minimal disc bulging  from C2-3 through C5-6 without central canal or neural foraminal  stenosis.     Cord: There is no intrinsic signal abnormality or abnormal  enhancement of the cervical cord.     Thoracic spine:     Alignment: The vertebral alignment is within normal limits.     Vertebrae/Intervertebral Discs: The vertebral bodies demonstrate  expected height. The marrow signal is within normal limits. The  intervertebral discs demonstrate expected signal and morphology.     Cord: The thoracic cord is mildly degraded by artifact on the axial  gradient echo T2 weighted images. Otherwise, no intrinsic signal  abnormality or abnormal enhancement of the thoracic cord is  identified.     Lumbar spine:     Please correlate with the report from the unenhanced examination  performed on December 10, 2019.     Alignment of the lumbar vertebral bodies is unchanged. The vertebral  body heights remain preserved.     Minimal linear enhancement adjacent to the conus is thought to be due  to vessels. There is otherwise no abnormal enhancement of the conus.  There is slight hyperintensity of nerve roots of the cauda  equina  within the thecal sac from L5 caudally.     There is again evidence of a disc protrusion. Please note that the  prior MRI of the lumbar spine was numbered from below. When numbering  from above the disc herniation is at the L4-5 level and there is a  partially lumbarized S1 segment.     The prevertebral and posterior paraspinous soft tissues are within  normal limits.     IMPRESSION:  1. No central canal stenosis within the cervical or thoracic spine.  There are no focal intramedullary signal abnormalities within the  cervical or thoracic cord.  2. Please note there is a change in enumeration of the lumbar spine.  The prior unenhanced examination was numbered from below. When  numbering from above there is a partially lumbarized S1 segment and  the disc protrusion is at the L4-5 level.  3. Faint hyperintensity on the postcontrast images along the nerve  roots of the cauda equina within the thecal sac from L5 caudally  conceivably could be due to technique. Very minimal nerve root  enhancement is difficult to entirely exclude, however. Please note it  is difficult to accurately assess for enhancement as the precontrast  images were not acquired at the same time.    XR foot 2012  Findings:  AP, lateral, and oblique views of the right foot demonstrate an   apparent nondisplaced fracture at the medial aspect of the base of   the fifth proximal phalanx, best seen on AP view, with lucency   extending to the articular surface consistent with intra-articular   extension. There is soft tissue swelling overlying the lateral aspect   of the fifth metatarsophalangeal joint as well. No other acute   fracture is identified. There is no dislocation.     Impression:  1. Nondisplaced fracture at the base of the fifth proximal phalanx.    Assessment/Plan    A 39 year old F here for evaluation of a +dsDNA and FLORESITA of 1:80. No cytopenias.     Clinically no stigmata of lupus.  - will obtain labs    She has longstanding IBP   -  start with XR SI joint.        RTC in 1 month for discussion of results       Emiliano Garza MD  Division of Rheumatology   [facility]    Addendum:  FLORESITA negative.  Although anti-ds-DNA testing is low positive, low to moderate titer false positive test results do occur and the anti-ds-DNA result must be interpreted in the context of the history, physical and all other clinical data. At present the data do not suggest a diagnosis of systemic lupus or any other rheumatologic autoimmune disease.   No evidence of lupus activity clinically or serologically.   XR SI joint pending for evaluation of IBP       Addendum:  Patient sent a photo consistent with a malar rash and is reporting arthralgias and myalgias.   She had a +FLORESITA 1:80 at one point in her life and has a + dsDNA without major organ involvement.   Currently meets lupus criteria  I called her and counseled her on risks/benefits of HCQ and she is agreeable to start. I sent her a patient information sheet about it on [employer].   I will see her in 4 months for follow up.

## 2024-10-17 DIAGNOSIS — G89.29 CHRONIC LOW BACK PAIN, UNSPECIFIED BACK PAIN LATERALITY, UNSPECIFIED WHETHER SCIATICA PRESENT: Primary | ICD-10-CM

## 2024-10-17 DIAGNOSIS — M54.50 CHRONIC LOW BACK PAIN, UNSPECIFIED BACK PAIN LATERALITY, UNSPECIFIED WHETHER SCIATICA PRESENT: Primary | ICD-10-CM

## 2024-10-17 DIAGNOSIS — M54.89 INFLAMMATORY BACK PAIN: ICD-10-CM

## 2024-10-17 LAB
ANA SER QL HEP2 SUBST: NEGATIVE
CREAT UR-MCNC: 216.1 MG/DL (ref 20–320)
HOLD SPECIMEN: NORMAL
PROT UR-ACNC: 14 MG/DL (ref 5–24)
PROT/CREAT UR: 0.06 MG/MG CREAT (ref 0–0.17)

## 2024-10-18 ENCOUNTER — TELEPHONE (OUTPATIENT)
Facility: CLINIC | Age: 39
End: 2024-10-18
Payer: MEDICAID

## 2024-10-18 NOTE — RESULT ENCOUNTER NOTE
Please let the patient know that the syphilis test came back negative.     Everything else on her labs is stable, will discuss at follow up and x ray is pending

## 2024-10-21 ENCOUNTER — HOSPITAL ENCOUNTER (OUTPATIENT)
Dept: RADIOLOGY | Facility: HOSPITAL | Age: 39
Discharge: HOME | End: 2024-10-21
Payer: MEDICAID

## 2024-10-21 DIAGNOSIS — M54.50 CHRONIC LOW BACK PAIN, UNSPECIFIED BACK PAIN LATERALITY, UNSPECIFIED WHETHER SCIATICA PRESENT: ICD-10-CM

## 2024-10-21 DIAGNOSIS — G89.29 CHRONIC LOW BACK PAIN, UNSPECIFIED BACK PAIN LATERALITY, UNSPECIFIED WHETHER SCIATICA PRESENT: ICD-10-CM

## 2024-10-21 PROCEDURE — 72202 X-RAY EXAM SI JOINTS 3/> VWS: CPT

## 2024-10-21 PROCEDURE — 72202 X-RAY EXAM SI JOINTS 3/> VWS: CPT | Performed by: RADIOLOGY

## 2024-11-09 ENCOUNTER — HOSPITAL ENCOUNTER (OUTPATIENT)
Dept: RADIOLOGY | Facility: HOSPITAL | Age: 39
Discharge: HOME | End: 2024-11-09
Payer: MEDICAID

## 2024-11-09 DIAGNOSIS — G89.29 CHRONIC LOW BACK PAIN, UNSPECIFIED BACK PAIN LATERALITY, UNSPECIFIED WHETHER SCIATICA PRESENT: ICD-10-CM

## 2024-11-09 DIAGNOSIS — M54.89 INFLAMMATORY BACK PAIN: ICD-10-CM

## 2024-11-09 DIAGNOSIS — M54.50 CHRONIC LOW BACK PAIN, UNSPECIFIED BACK PAIN LATERALITY, UNSPECIFIED WHETHER SCIATICA PRESENT: ICD-10-CM

## 2024-11-15 ENCOUNTER — HOSPITAL ENCOUNTER (OUTPATIENT)
Dept: RADIOLOGY | Facility: HOSPITAL | Age: 39
Discharge: HOME | End: 2024-11-15
Payer: MEDICAID

## 2024-11-15 PROCEDURE — 72195 MRI PELVIS W/O DYE: CPT

## 2024-11-20 ENCOUNTER — APPOINTMENT (OUTPATIENT)
Facility: CLINIC | Age: 39
End: 2024-11-20
Payer: MEDICAID

## 2024-11-25 DIAGNOSIS — M32.9 SLE (SYSTEMIC LUPUS ERYTHEMATOSUS RELATED SYNDROME) (MULTI): Primary | ICD-10-CM

## 2024-11-25 RX ORDER — HYDROXYCHLOROQUINE SULFATE 200 MG/1
400 TABLET, FILM COATED ORAL DAILY
Qty: 240 TABLET | Refills: 0 | Status: SHIPPED | OUTPATIENT
Start: 2024-11-25 | End: 2025-03-25

## 2024-12-20 ENCOUNTER — APPOINTMENT (OUTPATIENT)
Facility: CLINIC | Age: 39
End: 2024-12-20
Payer: MEDICAID

## 2025-02-19 ENCOUNTER — APPOINTMENT (OUTPATIENT)
Facility: CLINIC | Age: 40
End: 2025-02-19
Payer: MEDICAID

## 2025-03-28 ENCOUNTER — APPOINTMENT (OUTPATIENT)
Dept: OBSTETRICS AND GYNECOLOGY | Facility: CLINIC | Age: 40
End: 2025-03-28
Payer: MEDICAID

## 2025-04-14 ENCOUNTER — APPOINTMENT (OUTPATIENT)
Dept: OPHTHALMOLOGY | Facility: CLINIC | Age: 40
End: 2025-04-14
Payer: MEDICAID

## 2025-04-14 DIAGNOSIS — M32.9 SLE (SYSTEMIC LUPUS ERYTHEMATOSUS RELATED SYNDROME) (MULTI): ICD-10-CM

## 2025-04-14 DIAGNOSIS — Z79.899 LONG-TERM USE OF PLAQUENIL: Primary | ICD-10-CM

## 2025-04-14 PROCEDURE — 99203 OFFICE O/P NEW LOW 30 MIN: CPT | Performed by: STUDENT IN AN ORGANIZED HEALTH CARE EDUCATION/TRAINING PROGRAM

## 2025-04-14 PROCEDURE — 92083 EXTENDED VISUAL FIELD XM: CPT | Performed by: STUDENT IN AN ORGANIZED HEALTH CARE EDUCATION/TRAINING PROGRAM

## 2025-04-14 ASSESSMENT — VISUAL ACUITY
METHOD: SNELLEN - LINEAR
OD_CC: 20/20
CORRECTION_TYPE: GLASSES
OS_CC: 20/25

## 2025-04-14 ASSESSMENT — CONF VISUAL FIELD
OD_SUPERIOR_NASAL_RESTRICTION: 0
OD_SUPERIOR_TEMPORAL_RESTRICTION: 0
OD_NORMAL: 1
OD_INFERIOR_NASAL_RESTRICTION: 0
OS_SUPERIOR_TEMPORAL_RESTRICTION: 0
OS_NORMAL: 1
OS_INFERIOR_TEMPORAL_RESTRICTION: 0
OD_INFERIOR_TEMPORAL_RESTRICTION: 0
OS_INFERIOR_NASAL_RESTRICTION: 0
OS_SUPERIOR_NASAL_RESTRICTION: 0

## 2025-04-14 ASSESSMENT — ENCOUNTER SYMPTOMS
PSYCHIATRIC NEGATIVE: 0
GASTROINTESTINAL NEGATIVE: 0
CARDIOVASCULAR NEGATIVE: 0
MUSCULOSKELETAL NEGATIVE: 0
HEMATOLOGIC/LYMPHATIC NEGATIVE: 0
ALLERGIC/IMMUNOLOGIC NEGATIVE: 0
RESPIRATORY NEGATIVE: 0
CONSTITUTIONAL NEGATIVE: 0
ENDOCRINE NEGATIVE: 0
NEUROLOGICAL NEGATIVE: 0
EYES NEGATIVE: 1

## 2025-04-14 ASSESSMENT — SLIT LAMP EXAM - LIDS
COMMENTS: NORMAL
COMMENTS: NORMAL

## 2025-04-14 ASSESSMENT — CUP TO DISC RATIO
OD_RATIO: .20
OS_RATIO: .20

## 2025-04-14 ASSESSMENT — TONOMETRY
OS_IOP_MMHG: 16
OD_IOP_MMHG: 16
IOP_METHOD: TONOPEN

## 2025-04-14 ASSESSMENT — EXTERNAL EXAM - RIGHT EYE: OD_EXAM: NORMAL

## 2025-04-14 ASSESSMENT — EXTERNAL EXAM - LEFT EYE: OS_EXAM: NORMAL

## 2025-04-14 NOTE — PROGRESS NOTES
Assessment/Plan   Diagnoses and all orders for this visit:  Long-term use of Plaquenil  SLE (systemic lupus erythematosus related syndrome) (Multi)  -patient referred from Rheumatology for baseline Plaquenil Testing  -Started 12/2024 400mg daily PO  -unable to obtain baseline OCT MAC today due to [employer] not fxning  -Baseline HVF 10-2 normal and fundus exam normal  -pt ed; will have patient RTC for f/u to obtain OCT MAC    RTC for f/u with MRX and OCT MAC-no dilation  (Bill this next exam to vision)

## 2025-05-21 ENCOUNTER — TELEPHONE (OUTPATIENT)
Facility: CLINIC | Age: 40
End: 2025-05-21
Payer: MEDICAID

## 2025-05-21 DIAGNOSIS — M32.9 SLE (SYSTEMIC LUPUS ERYTHEMATOSUS RELATED SYNDROME) (MULTI): Primary | ICD-10-CM

## 2025-05-21 RX ORDER — HYDROXYCHLOROQUINE SULFATE 200 MG/1
2 TABLET, FILM COATED ORAL DAILY
COMMUNITY
End: 2025-05-21 | Stop reason: SDUPTHER

## 2025-05-21 RX ORDER — HYDROXYCHLOROQUINE SULFATE 200 MG/1
400 TABLET, FILM COATED ORAL DAILY
Qty: 180 TABLET | Refills: 1 | Status: SHIPPED | OUTPATIENT
Start: 2025-05-21 | End: 2025-11-17

## 2025-05-23 ENCOUNTER — APPOINTMENT (OUTPATIENT)
Dept: OBSTETRICS AND GYNECOLOGY | Facility: CLINIC | Age: 40
End: 2025-05-23
Payer: MEDICAID

## 2025-05-23 VITALS
DIASTOLIC BLOOD PRESSURE: 70 MMHG | WEIGHT: 188.8 LBS | HEIGHT: 65 IN | SYSTOLIC BLOOD PRESSURE: 104 MMHG | BODY MASS INDEX: 31.46 KG/M2 | HEART RATE: 50 BPM

## 2025-05-23 DIAGNOSIS — Z01.419 WELL WOMAN EXAM: Primary | ICD-10-CM

## 2025-05-23 DIAGNOSIS — Z12.4 CERVICAL CANCER SCREENING: ICD-10-CM

## 2025-05-23 PROCEDURE — 99386 PREV VISIT NEW AGE 40-64: CPT | Performed by: NURSE PRACTITIONER

## 2025-05-23 PROCEDURE — 87626 HPV SEP HI-RSK TYP&POOL RSLT: CPT

## 2025-05-23 PROCEDURE — 88175 CYTOPATH C/V AUTO FLUID REDO: CPT

## 2025-05-23 PROCEDURE — 3008F BODY MASS INDEX DOCD: CPT | Performed by: NURSE PRACTITIONER

## 2025-05-23 ASSESSMENT — PAIN SCALES - GENERAL: PAINLEVEL_OUTOF10: 0-NO PAIN

## 2025-05-23 NOTE — PROGRESS NOTES
"Subjective   Mariela Cuevas is a 40 y.o. female who is here for a routine exam. She continues to have periods. She has previously seen a GYN in [place].     Pap History: She states her last pap was 2 years ago.   Current contraception: abstinence, she also had a copper IUD placed 2 years ago  History of abnormal Pap smear: yes - She reports hx of HPV+ paps & abnormal cytology with hx of cryotherapy and cone biopsy. The abnormal paps occurred in her 20s.   Mammogram UTD: No, last was on 3/19/24. She was recommended annual screening mammography in one year. She has an order for her next MMG, however she has been very busy as her mom has lung cancer.   Family history of breast cancer: yes     Not sexually active.   She is single.   Declines need for STD testing. She recently had STD testing in November and she had a false positive with syphilis.   Denies vulvar burning or itching.     OB History          2    Para   2    Term                AB        Living             SAB        IAB        Ectopic        Multiple        Live Births                    Patient's last menstrual period was 2025.       PMH:  - Lyme disease, major depressive disorder, anxiety disorder, SLE     Review of Systems    Objective   /70 (Patient Position: Sitting)   Pulse 50   Ht 1.651 m (5' 5\")   Wt 85.6 kg (188 lb 12.8 oz)   LMP 2025   BMI 31.42 kg/m²     Physical Exam  Constitutional:       General: She is not in acute distress.     Appearance: Normal appearance.   Genitourinary:      Urethral meatus normal.      Genitourinary Comments: Pap was collected.       Right Labia: No rash or lesions.     Left Labia: No lesions or rash.     No vaginal atrophy present.       Right Adnexa: not tender and no mass present.     Left Adnexa: not tender and no mass present.     No cervical motion tenderness.      IUD strings visualized.      Uterus is not enlarged or tender.      No uterine mass detected.     Uterus " is not absent.     No urethral tenderness present.   POP-Q measurements were:      Aa: -1, Ba: C: -8     gH: 4, pB: TVL:      Ap: Bp: D:   Breasts:     Breasts are symmetrical.      Right: No mass, nipple discharge or skin change.      Left: No mass, nipple discharge or skin change.   HENT:      Head: Normocephalic and atraumatic.   Pulmonary:      Effort: Pulmonary effort is normal.   Psychiatric:         Behavior: Behavior normal.         Thought Content: Thought content normal.         Judgment: Judgment normal.         Assessment/Plan   40 y.o. female being assessed for an annual GYN exam.       Diagnoses:   #1 Annual GYN exam     Plan:   1. Annual GYN exam    - She states she already has a MMG order in place for this year.   - Normal breast and pelvic exam today.  - Pap was collected today.   - She is doing well.      Follow-up in 1 year with CHANCE Baeza.     SPEKE audio duration: 16 minutes    I spent a total of eConsult Time: 20 minutes in face to face and non face to face time.     Scribe Attestation:   IGlenna, am scribing for virtually, and in the presence of CHANCE Baeza on 05/23/2025 at 12:32 PM.   I, CHANCE Baeza, personally performed the services described in this documentation which was scribed virtually and I confirm that it is both accurate and complete.

## 2025-06-02 ENCOUNTER — APPOINTMENT (OUTPATIENT)
Dept: OBSTETRICS AND GYNECOLOGY | Facility: CLINIC | Age: 40
End: 2025-06-02
Payer: MEDICAID

## 2025-06-04 PROBLEM — G95.9 MYELOPATHY (MULTI): Status: RESOLVED | Noted: 2020-01-22 | Resolved: 2025-06-04

## 2025-06-04 PROBLEM — M32.9 SLE (SYSTEMIC LUPUS ERYTHEMATOSUS RELATED SYNDROME) (MULTI): Status: RESOLVED | Noted: 2025-04-14 | Resolved: 2025-06-04

## 2025-06-05 LAB
CYTOLOGY CMNT CVX/VAG CYTO-IMP: NORMAL
HPV HR 12 DNA GENITAL QL NAA+PROBE: NEGATIVE
HPV HR GENOTYPES PNL CVX NAA+PROBE: NEGATIVE
HPV16 DNA SPEC QL NAA+PROBE: NEGATIVE
HPV18 DNA SPEC QL NAA+PROBE: NEGATIVE
LAB AP CONTRACEPTIVE HISTORY: NORMAL
LAB AP HPV GENOTYPE QUESTION: YES
LAB AP HPV HR: NORMAL
LABORATORY COMMENT REPORT: NORMAL
LMP START DATE: NORMAL
PATH REPORT.TOTAL CANCER: NORMAL

## 2025-06-20 ENCOUNTER — APPOINTMENT (OUTPATIENT)
Facility: CLINIC | Age: 40
End: 2025-06-20
Payer: MEDICAID

## 2025-06-20 VITALS
HEART RATE: 81 BPM | SYSTOLIC BLOOD PRESSURE: 113 MMHG | WEIGHT: 186 LBS | DIASTOLIC BLOOD PRESSURE: 70 MMHG | BODY MASS INDEX: 30.95 KG/M2

## 2025-06-20 DIAGNOSIS — E55.9 VITAMIN D INSUFFICIENCY: ICD-10-CM

## 2025-06-20 DIAGNOSIS — M32.9 SLE (SYSTEMIC LUPUS ERYTHEMATOSUS RELATED SYNDROME) (MULTI): Primary | ICD-10-CM

## 2025-06-20 DIAGNOSIS — Z79.899 HIGH RISK MEDICATION USE: ICD-10-CM

## 2025-06-20 PROCEDURE — 99215 OFFICE O/P EST HI 40 MIN: CPT | Performed by: INTERNAL MEDICINE

## 2025-06-20 PROCEDURE — 1036F TOBACCO NON-USER: CPT | Performed by: INTERNAL MEDICINE

## 2025-06-20 ASSESSMENT — PATIENT HEALTH QUESTIONNAIRE - PHQ9
2. FEELING DOWN, DEPRESSED OR HOPELESS: NOT AT ALL
1. LITTLE INTEREST OR PLEASURE IN DOING THINGS: NOT AT ALL
SUM OF ALL RESPONSES TO PHQ9 QUESTIONS 1 AND 2: 0

## 2025-06-20 ASSESSMENT — PAIN SCALES - GENERAL: PAINLEVEL_OUTOF10: 0-NO PAIN

## 2025-06-20 NOTE — PROGRESS NOTES
Subjective   Patient ID: [id number]   Mariela Cuevas is a 40 y.o. female who presents for SLE?    HPI  PMH/PSH: Lumbar DDD, lumbar stenosis, lumbar radiculopathy, depression, anxiety, migraines  Social: Nonsmoker, alcohol intake a few times a year, no drug use. 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 Has 2 children.   FHx: Grandmother with MS     First visit 10/2024:   Prior labs with a FLORESITA 1:80, dsDNA 17 in 2019. Repeat was 11  TPO +     In 2019 she had saddle anasthesia all of a sudden and self-resolved and for that reason she had the testing done.     She has had chronic low back for 10 years, she has Lumbar DDD, lumbar stenosis, lumbar radiculopathy. Low back pain is worse after prolonged sitting and first thing in AM, gets better with activity.     She also mentioned having done a home kit syphilis test from [facility] and it came back positive.     Current meds:  HCQ 400mg daily started 11/26/2024     She reports an improvement in myalgias since initiation with HCQ, but reports hip pain has significant improved with HCQ.   Malar rash less frequent since initiation of HCQ.   HCQ has also helped the fatigue.   She missed HCQ for a week and she felt a difference.     ROS  Denies fever, weight loss, night sweats. + Fatigue and unrefreshed in AM.  Denies dry eyes, blurry vision, redness or pain or photophobia. + dry mouth, dental loss, loss of taste, nasal or oral ulcers, difficulty swallowing, nasal crusting or recurrent sinus infections. Denies chest pain. Denies shortness of breath, cough, asthma, or recurrent respiratory infections. Denies nausea, vomiting, heartburn, abdominal pain, + chronic constipation. No diarrhea, melena or hematochezia. No recurrent urinary infections or STDs, no genital or anal ulcers. Denies photosensitivity, rash or lesions, Raynaud's phenomenon, psoriatic lesions, or alopecia. + hair thinning after  COVID. Denies any numbness or tingling in hands and feet, sometimes on/off in her face. Denies history of clots (arterial or venous), or abortions/miscarriages. No joint pains, redness, hotness or swelling. No enthesitis, dactylitis. No morning stiffness       Patient Active Problem List   Diagnosis    Lumbar radiculopathy    Abnormal MRI, lumbar spine    Acute left-sided low back pain without sciatica    Anxiety disorder    B12 deficiency    Chronic lymphocytic thyroiditis    Copper deficiency    Enlarged lymph nodes    Human papilloma virus (HPV) infection    Lyme disease    Migraine without aura and without status migrainosus, not intractable    Mild episode of recurrent major depressive disorder    Neurodevelopmental disorder    Neurogenic bladder    Obesity, Class II, BMI 35-39.9    Perineal numbness    Ptosis of left eyelid    Trimalleolar fracture of ankle, closed, right, with routine healing, subsequent encounter    Vitamin D deficiency    Long-term use of Plaquenil        Past Medical History:   Diagnosis Date    Encounter for screening for malignant neoplasm of cervix 2015    Cervical cancer screening    Migraine     Papillomavirus as the cause of diseases classified elsewhere     HPV in female    Personal history of other diseases of the digestive system 2014    History of hemorrhoids    Personal history of other specified conditions     History of abnormal Pap smear        Past Surgical History:   Procedure Laterality Date     SECTION, CLASSIC  2017     Section    COLONOSCOPY  08/10/2016    Colonoscopy (Fiberoptic)    OTHER SURGICAL HISTORY  2015    Colposcopy Cervix With Biopsy(S)    OTHER SURGICAL HISTORY  2015    Cervical Conization Loop Electrode Excision    OTHER SURGICAL HISTORY  2015    Cervical Conization By Cold Knife        Social History     Socioeconomic History    Marital status: Single     Spouse name: Not on file    Number of children:  Not on file    Years of education: Not on file    Highest education level: Not on file   Occupational History    Not on file   Tobacco Use    Smoking status: Former     Current packs/day: 1.00     Average packs/day: 1 pack/day for 10.0 years (10.0 ttl pk-yrs)     Types: Cigarettes    Smokeless tobacco: Never   Vaping Use    Vaping status: Every Day    Substances: Nicotine   Substance and Sexual Activity    Alcohol use: Not Currently    Drug use: Never    Sexual activity: Not Currently     Partners: Male     Birth control/protection: I.U.D.   Other Topics Concern    Not on file   Social History Narrative    Not on file     Social Drivers of Health     Financial Resource Strain: Not on File (2022)    Received from [facility]    Financial Resource Strain     Financial Resource Strain: 0   Recent Concern: Financial Resource Strain - At Risk (2022)    Received from [facility]    Financial Resource Strain     Financial Resource Strain: 2   Food Insecurity: Not on File (2022)    Received from [facility]    Food Insecurity     Food: 0   Transportation Needs: Not on File (2022)    Received from [facility]    Transportation Needs     Transportation: 0   Physical Activity: Not on File (2022)    Received from [facility]    Physical Activity     Physical Activity: 0   Stress: Not on File (2022)    Received from [facility]    Stress     Stress: 0   Recent Concern: Stress - At Risk (2022)    Received from [facility]    Stress     Stress: 2   Social Connections: Not on File (2022)    Received from [facility]    Social Connections     Connectedness: 0   Intimate Partner Violence: Not on file   Housing Stability: Not on File (2022)    Received from [facility]    Housing Stability     Housin        No Known Allergies       Current Outpatient Medications:     copper (ParaGard T 380A) 380 square mm IUD, 1 each by intrauterine route., Disp: , Rfl:     desvenlafaxine 100 mg 24 hr tablet, Take 1 tablet (100 mg) by mouth once daily., Disp: ,  Rfl:     ergocalciferol (Vitamin D-2) 1.25 MG (89003 UT) capsule, Take 1 capsule (1.25 mg) by mouth 1 (one) time per week., Disp: , Rfl:     hydroxychloroquine (Plaquenil) 200 mg tablet, Take 2 tablets (400 mg) by mouth once daily., Disp: 180 tablet, Rfl: 1    phentermine 15 mg capsule, Take 1 capsule (15 mg) by mouth once daily in the morning. Take before meals., Disp: , Rfl:     SUMAtriptan (Imitrex) 50 mg tablet, Take 1 tablet by mouth 1 time as needed for migraine for up to 1 dose., Disp: , Rfl:     Trulicity 4.5 mg/0.5 mL pen injector, INJECT 4.5MG INTO THE SKIN ONCE A WEEK, Disp: , Rfl:        Objective     Visit Vitals  /70   Pulse 81     Physical Exam  Copied from previous exam unless annotated below   11/2024:    General: AAOx3, Cooperative  Head: normocephalic, atraumatic  Eyes: External normal   Throat/Mouth: No oral deformities, no cheek swelling, mucosa appear moist, no oral ulcers noted or loss of dentition   Skin: Malar rash   MSK: Upper Extremities:  Hand/Fingers: No erythema, swelling, tenderness or warmth at DIP, PIP, or MCP joints, FROM grossly. Good hand . No nodules. No deformities   Wrists: No erythema, swelling, warmth or tenderness at wrist, FROM grossly  Elbows: No tenderness, swelling, erythema or warmth at elbows, FROM grossly. No nodules   Shoulders: FROM  Lower Extremities:   Hips: No obvious deformities. No joint tenderness, normal ROM grossly. Log roll test negative bilaterally. Lucille test is negative bilaterally.   Knees: No tenderness, deformities, swelling, rashes, or warmth, normal ROM grossly. No crepitus  Ankles: No deformities, tenderness, edema, erythema, ulceration, or warmth at the ankle  Feet: Negative MTP squeeze. Normal ROM grossly.   Spine: + Paraspinal tenderness to palpation. No SI joint tenderness.     Lab Results   Component Value Date    WBC 4.5 10/16/2024    HGB 13.9 10/16/2024    HCT 43.3 10/16/2024    MCV 94 10/16/2024     10/16/2024         "Chemistry    Lab Results   Component Value Date/Time     10/16/2024 1109    K 3.9 10/16/2024 1109     (H) 10/16/2024 1109    CO2 23 10/16/2024 1109    BUN 9 10/16/2024 1109    CREATININE 0.96 10/16/2024 1109    Lab Results   Component Value Date/Time    CALCIUM 9.5 10/16/2024 1109    ALKPHOS 75 10/16/2024 1109    AST 12 10/16/2024 1109    ALT 9 10/16/2024 1109    BILITOT 1.0 10/16/2024 1109           Lab Results   Component Value Date    CRP <0.10 12/12/2019      Lab Results   Component Value Date    FLORESITA Negative 10/16/2024    SEDRATE 6 12/12/2019      No results found for: \"CKTOTAL\"  Lab Results   Component Value Date    NEUTROABS 2.47 10/16/2024      No results found for: \"FERRITIN\"   No results found for: \"HEPATOT\", \"HEPAIGM\", \"HEPBCIGM\", \"HEPBCAB\", \"HBEAG\", \"HEPCAB\"   Lab Results   Component Value Date    ALT 9 10/16/2024    AST 12 10/16/2024    ALKPHOS 75 10/16/2024    BILITOT 1.0 10/16/2024      No results found for: \"PPD\"   No results found for: \"URICACID\"   Lab Results   Component Value Date    CALCIUM 9.5 10/16/2024      No results found for: \"SPEP\", \"UPEP\"   No results found for: \"ALBUR\", \"[id number]\"     [facility] Visual Field - OU - Both Eyes  Right Eye  Threshold was 10-2. Strategy was DYLON. Reliability was good.     Left Eye  Threshold was 10-2. Strategy was DYLON. Reliability was good.     Notes  OD: baseline scan in clinic; scattered non specific defects-no toxicity  OS: baseline scan in clinic; scattered non specific defects-no toxicity     === 03/22/24 ===    XR LUMBAR SPINE COMPLETE 4+ VIEWS    - Impression -  Mild lumbar degenerative changes slightly more prominent at the L4-5  level.    Mild scoliosis.    Signed by: Pankaj Clifford 3/23/2024 5:04 PM  Dictation workstation:   [id number]     MR cervical/thoracic/lumbar spine 2019:  FINDINGS:  Cervical spine:     Alignment: There is mild reversal of the normal cervical lordosis.     Vertebrae/Intervertebral Discs: The vertebral bodies " demonstrate  expected height. The marrow signal is within normal limits. There is  minimal degenerative endplate spurring. There is minimal disc bulging  from C2-3 through C5-6 without central canal or neural foraminal  stenosis.     Cord: There is no intrinsic signal abnormality or abnormal  enhancement of the cervical cord.     Thoracic spine:     Alignment: The vertebral alignment is within normal limits.     Vertebrae/Intervertebral Discs: The vertebral bodies demonstrate  expected height. The marrow signal is within normal limits. The  intervertebral discs demonstrate expected signal and morphology.     Cord: The thoracic cord is mildly degraded by artifact on the axial  gradient echo T2 weighted images. Otherwise, no intrinsic signal  abnormality or abnormal enhancement of the thoracic cord is  identified.     Lumbar spine:     Please correlate with the report from the unenhanced examination  performed on December 10, 2019.     Alignment of the lumbar vertebral bodies is unchanged. The vertebral  body heights remain preserved.     Minimal linear enhancement adjacent to the conus is thought to be due  to vessels. There is otherwise no abnormal enhancement of the conus.  There is slight hyperintensity of nerve roots of the cauda equina  within the thecal sac from L5 caudally.     There is again evidence of a disc protrusion. Please note that the  prior MRI of the lumbar spine was numbered from below. When numbering  from above the disc herniation is at the L4-5 level and there is a  partially lumbarized S1 segment.     The prevertebral and posterior paraspinous soft tissues are within  normal limits.     IMPRESSION:  1. No central canal stenosis within the cervical or thoracic spine.  There are no focal intramedullary signal abnormalities within the  cervical or thoracic cord.  2. Please note there is a change in enumeration of the lumbar spine.  The prior unenhanced examination was numbered from below.  When  numbering from above there is a partially lumbarized S1 segment and  the disc protrusion is at the L4-5 level.  3. Faint hyperintensity on the postcontrast images along the nerve  roots of the cauda equina within the thecal sac from L5 caudally  conceivably could be due to technique. Very minimal nerve root  enhancement is difficult to entirely exclude, however. Please note it  is difficult to accurately assess for enhancement as the precontrast  images were not acquired at the same time.    XR foot 2012  Findings:  AP, lateral, and oblique views of the right foot demonstrate an   apparent nondisplaced fracture at the medial aspect of the base of   the fifth proximal phalanx, best seen on AP view, with lucency   extending to the articular surface consistent with intra-articular   extension. There is soft tissue swelling overlying the lateral aspect   of the fifth metatarsophalangeal joint as well. No other acute   fracture is identified. There is no dislocation.     Impression:  1. Nondisplaced fracture at the base of the fifth proximal phalanx.    Assessment/Plan    SLE (photos c/w malar rash, arthralgias, +dsDNA and FLORESITA of 1:80 at one point in her life). No cytopenias. No major organ involvement.     - Continue HCQ 400mg daily (5mg/xt=818cd) , improvement in symptomatology. UTD on yearly eye exam, saw Dr. Barnett 04/2025.   - Labs today     She has longstanding IBP, lumbar stenosis, DDD. XR SI joint is normal        RTC in 6 months or sooner as needed      Emiliano Garza MD  Division of Rheumatology   [facility]

## 2025-06-21 LAB
25(OH)D3+25(OH)D2 SERPL-MCNC: 74 NG/ML (ref 30–100)
ALBUMIN SERPL-MCNC: 4.2 G/DL (ref 3.6–5.1)
ALP SERPL-CCNC: 66 U/L (ref 31–125)
ALT SERPL-CCNC: 9 U/L (ref 6–29)
ANION GAP SERPL CALCULATED.4IONS-SCNC: 6 MMOL/L (CALC) (ref 7–17)
APPEARANCE UR: CLEAR
AST SERPL-CCNC: 14 U/L (ref 10–30)
BACTERIA #/AREA URNS HPF: NORMAL /HPF
BACTERIA UR CULT: NORMAL
BASOPHILS # BLD AUTO: 30 CELLS/UL (ref 0–200)
BASOPHILS NFR BLD AUTO: 0.9 %
BILIRUB SERPL-MCNC: 0.7 MG/DL (ref 0.2–1.2)
BILIRUB UR QL STRIP: NEGATIVE
BUN SERPL-MCNC: 6 MG/DL (ref 7–25)
C3 SERPL-MCNC: NORMAL MG/DL
C4 SERPL-MCNC: NORMAL MG/DL
CALCIUM SERPL-MCNC: 9 MG/DL (ref 8.6–10.2)
CHLORIDE SERPL-SCNC: 108 MMOL/L (ref 98–110)
CO2 SERPL-SCNC: 25 MMOL/L (ref 20–32)
COLOR UR: YELLOW
CREAT SERPL-MCNC: 0.88 MG/DL (ref 0.5–0.99)
CREAT UR-MCNC: NORMAL MG/DL
DSDNA AB SER-ACNC: NORMAL [IU]/ML
EGFRCR SERPLBLD CKD-EPI 2021: 85 ML/MIN/1.73M2
EOSINOPHIL # BLD AUTO: 40 CELLS/UL (ref 15–500)
EOSINOPHIL NFR BLD AUTO: 1.2 %
ERYTHROCYTE [DISTWIDTH] IN BLOOD BY AUTOMATED COUNT: 12.2 % (ref 11–15)
GLUCOSE SERPL-MCNC: 79 MG/DL (ref 65–139)
GLUCOSE UR QL STRIP: NEGATIVE
HCT VFR BLD AUTO: 40.3 % (ref 35–45)
HGB BLD-MCNC: 13.1 G/DL (ref 11.7–15.5)
HGB UR QL STRIP: NEGATIVE
HYALINE CASTS #/AREA URNS LPF: NORMAL /LPF
KETONES UR QL STRIP: NEGATIVE
LEUKOCYTE ESTERASE UR QL STRIP: NEGATIVE
LYMPHOCYTES # BLD AUTO: 1145 CELLS/UL (ref 850–3900)
LYMPHOCYTES NFR BLD AUTO: 34.7 %
MCH RBC QN AUTO: 30.8 PG (ref 27–33)
MCHC RBC AUTO-ENTMCNC: 32.5 G/DL (ref 32–36)
MCV RBC AUTO: 94.8 FL (ref 80–100)
MONOCYTES # BLD AUTO: 314 CELLS/UL (ref 200–950)
MONOCYTES NFR BLD AUTO: 9.5 %
NEUTROPHILS # BLD AUTO: 1772 CELLS/UL (ref 1500–7800)
NEUTROPHILS NFR BLD AUTO: 53.7 %
NITRITE UR QL STRIP: NEGATIVE
PH UR STRIP: 7.5 [PH] (ref 5–8)
PLATELET # BLD AUTO: 261 THOUSAND/UL (ref 140–400)
PMV BLD REES-ECKER: 10.7 FL (ref 7.5–12.5)
POTASSIUM SERPL-SCNC: 4.3 MMOL/L (ref 3.5–5.3)
PROT SERPL-MCNC: 6.5 G/DL (ref 6.1–8.1)
PROT UR QL STRIP: NEGATIVE
PROT UR-MCNC: NORMAL G/DL
PROT/CREAT UR: NORMAL MG/G{CREAT}
PROT/CREAT UR: NORMAL MG/G{CREAT}
RBC # BLD AUTO: 4.25 MILLION/UL (ref 3.8–5.1)
RBC #/AREA URNS HPF: NORMAL /HPF
SERVICE CMNT-IMP: NORMAL
SODIUM SERPL-SCNC: 139 MMOL/L (ref 135–146)
SP GR UR STRIP: 1.02 (ref 1–1.03)
SQUAMOUS #/AREA URNS HPF: NORMAL /HPF
WBC # BLD AUTO: 3.3 THOUSAND/UL (ref 3.8–10.8)
WBC #/AREA URNS HPF: NORMAL /HPF

## 2025-06-23 LAB
25(OH)D3+25(OH)D2 SERPL-MCNC: 74 NG/ML (ref 30–100)
ALBUMIN SERPL-MCNC: 4.2 G/DL (ref 3.6–5.1)
ALP SERPL-CCNC: 66 U/L (ref 31–125)
ALT SERPL-CCNC: 9 U/L (ref 6–29)
ANION GAP SERPL CALCULATED.4IONS-SCNC: 6 MMOL/L (CALC) (ref 7–17)
APPEARANCE UR: CLEAR
AST SERPL-CCNC: 14 U/L (ref 10–30)
BACTERIA #/AREA URNS HPF: NORMAL /HPF
BACTERIA UR CULT: NORMAL
BASOPHILS # BLD AUTO: 30 CELLS/UL (ref 0–200)
BASOPHILS NFR BLD AUTO: 0.9 %
BILIRUB SERPL-MCNC: 0.7 MG/DL (ref 0.2–1.2)
BILIRUB UR QL STRIP: NEGATIVE
BUN SERPL-MCNC: 6 MG/DL (ref 7–25)
C3 SERPL-MCNC: 105 MG/DL (ref 83–193)
C4 SERPL-MCNC: 21 MG/DL (ref 15–57)
CALCIUM SERPL-MCNC: 9 MG/DL (ref 8.6–10.2)
CHLORIDE SERPL-SCNC: 108 MMOL/L (ref 98–110)
CO2 SERPL-SCNC: 25 MMOL/L (ref 20–32)
COLOR UR: YELLOW
CREAT SERPL-MCNC: 0.88 MG/DL (ref 0.5–0.99)
CREAT UR-MCNC: 138 MG/DL (ref 20–275)
DSDNA AB SER-ACNC: 11 IU/ML
EGFRCR SERPLBLD CKD-EPI 2021: 85 ML/MIN/1.73M2
EOSINOPHIL # BLD AUTO: 40 CELLS/UL (ref 15–500)
EOSINOPHIL NFR BLD AUTO: 1.2 %
ERYTHROCYTE [DISTWIDTH] IN BLOOD BY AUTOMATED COUNT: 12.2 % (ref 11–15)
GLUCOSE SERPL-MCNC: 79 MG/DL (ref 65–139)
GLUCOSE UR QL STRIP: NEGATIVE
HCT VFR BLD AUTO: 40.3 % (ref 35–45)
HGB BLD-MCNC: 13.1 G/DL (ref 11.7–15.5)
HGB UR QL STRIP: NEGATIVE
HYALINE CASTS #/AREA URNS LPF: NORMAL /LPF
KETONES UR QL STRIP: NEGATIVE
LEUKOCYTE ESTERASE UR QL STRIP: NEGATIVE
LYMPHOCYTES # BLD AUTO: 1145 CELLS/UL (ref 850–3900)
LYMPHOCYTES NFR BLD AUTO: 34.7 %
MCH RBC QN AUTO: 30.8 PG (ref 27–33)
MCHC RBC AUTO-ENTMCNC: 32.5 G/DL (ref 32–36)
MCV RBC AUTO: 94.8 FL (ref 80–100)
MONOCYTES # BLD AUTO: 314 CELLS/UL (ref 200–950)
MONOCYTES NFR BLD AUTO: 9.5 %
NEUTROPHILS # BLD AUTO: 1772 CELLS/UL (ref 1500–7800)
NEUTROPHILS NFR BLD AUTO: 53.7 %
NITRITE UR QL STRIP: NEGATIVE
PH UR STRIP: 7.5 [PH] (ref 5–8)
PLATELET # BLD AUTO: 261 THOUSAND/UL (ref 140–400)
PMV BLD REES-ECKER: 10.7 FL (ref 7.5–12.5)
POTASSIUM SERPL-SCNC: 4.3 MMOL/L (ref 3.5–5.3)
PROT SERPL-MCNC: 6.5 G/DL (ref 6.1–8.1)
PROT UR QL STRIP: NEGATIVE
PROT UR-MCNC: 11 MG/DL (ref 5–24)
PROT/CREAT UR: 0.08 MG/MG CREAT (ref 0.02–0.18)
PROT/CREAT UR: 80 MG/G CREAT (ref 24–184)
RBC # BLD AUTO: 4.25 MILLION/UL (ref 3.8–5.1)
RBC #/AREA URNS HPF: NORMAL /HPF
SERVICE CMNT-IMP: NORMAL
SODIUM SERPL-SCNC: 139 MMOL/L (ref 135–146)
SP GR UR STRIP: 1.02 (ref 1–1.03)
SQUAMOUS #/AREA URNS HPF: NORMAL /HPF
WBC # BLD AUTO: 3.3 THOUSAND/UL (ref 3.8–10.8)
WBC #/AREA URNS HPF: NORMAL /HPF

## 2025-09-08 ENCOUNTER — APPOINTMENT (OUTPATIENT)
Dept: OPHTHALMOLOGY | Facility: CLINIC | Age: 40
End: 2025-09-08
Payer: MEDICAID

## 2025-12-17 ENCOUNTER — APPOINTMENT (OUTPATIENT)
Facility: CLINIC | Age: 40
End: 2025-12-17
Payer: MEDICAID

## 2026-06-12 ENCOUNTER — APPOINTMENT (OUTPATIENT)
Dept: OBSTETRICS AND GYNECOLOGY | Facility: CLINIC | Age: 41
End: 2026-06-12
Payer: MEDICAID